# Patient Record
Sex: FEMALE | NOT HISPANIC OR LATINO | URBAN - METROPOLITAN AREA
[De-identification: names, ages, dates, MRNs, and addresses within clinical notes are randomized per-mention and may not be internally consistent; named-entity substitution may affect disease eponyms.]

---

## 2017-01-03 ENCOUNTER — IMPORTED ENCOUNTER (OUTPATIENT)
Dept: URBAN - METROPOLITAN AREA CLINIC 1 | Facility: CLINIC | Age: 32
End: 2017-01-03

## 2017-01-03 PROBLEM — H11.003: Noted: 2017-01-03

## 2017-01-03 PROBLEM — H04.123: Noted: 2017-01-03

## 2017-01-03 PROCEDURE — 92004 COMPRE OPH EXAM NEW PT 1/>: CPT

## 2017-01-03 NOTE — PATIENT DISCUSSION
1.  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 Use of sunglasses and observation were recommended. **Per [facility] quoted her $600.00 per eye if she decides to go with Sx2. Dry Eyes OU -- Recommended to patient to use Artificial Tears BID OU. Gave patient a sample of Systane BID-TID OU 3. Patient defers the refraction at today's visit just done 2 mos ago 4. Return for an appointment in 1 year for 30. with Dr. Joni Schaumann.

## 2018-01-03 ENCOUNTER — IMPORTED ENCOUNTER (OUTPATIENT)
Dept: URBAN - METROPOLITAN AREA CLINIC 1 | Facility: CLINIC | Age: 33
End: 2018-01-03

## 2018-01-03 PROBLEM — H11.003: Noted: 2018-01-03

## 2018-01-03 PROBLEM — H04.123: Noted: 2018-01-03

## 2018-01-03 PROBLEM — H16.143: Noted: 2018-01-03

## 2018-01-03 PROCEDURE — 92014 COMPRE OPH EXAM EST PT 1/>: CPT

## 2018-01-03 PROCEDURE — 92015 DETERMINE REFRACTIVE STATE: CPT

## 2018-01-03 PROCEDURE — 83861 MICROFLUID ANALY TEARS: CPT

## 2018-01-03 NOTE — PATIENT DISCUSSION
1.  GILBERTO w/ PEK OU- Mild Tear Lab results OU today 301/300. Recommend ATs TID-QID OU routinely (sample of Systane Balance given). Consider Restasis if no improvement or worsening of symptoms 2. Pterygium OU -- Use Sunglasses when exposed to UV light. MRX for glasses givenReturn for an appointment in 1 month 10 (check ks) with Dr. Oscar Lainez.

## 2018-06-29 ENCOUNTER — IMPORTED ENCOUNTER (OUTPATIENT)
Dept: URBAN - METROPOLITAN AREA CLINIC 1 | Facility: CLINIC | Age: 33
End: 2018-06-29

## 2018-06-29 PROBLEM — H04.123: Noted: 2018-06-29

## 2018-06-29 PROBLEM — H11.003: Noted: 2018-06-29

## 2018-06-29 PROBLEM — H16.143: Noted: 2018-06-29

## 2018-06-29 PROCEDURE — 92012 INTRM OPH EXAM EST PATIENT: CPT

## 2018-06-29 NOTE — PATIENT DISCUSSION
1.  Pterygium OU -- Patient desires excision for cosmetic reasons. Use Sunglasses when exposed to UV light. Patient requests estimate from [facility] regarding out of pocket cost for Pterygium Excision with Autograft. 2.  GILBERTO w/ PEK OU- Use ATs TID OU Return for an appointment in 1 yr 27 with Dr. Vito Sanchez.

## 2018-07-18 ENCOUNTER — IMPORTED ENCOUNTER (OUTPATIENT)
Dept: URBAN - METROPOLITAN AREA CLINIC 1 | Facility: CLINIC | Age: 33
End: 2018-07-18

## 2018-07-18 PROCEDURE — 65420 REMOVAL OF EYE LESION: CPT

## 2018-07-18 NOTE — PATIENT DISCUSSION
Operative Note: Excision of Pterygium OU Pre-op Dx OD: Pterygium OU Post-op Dx OD : Pterygium OU Procedure: Excision of Pterygium OU Surgeon: Tye Tristan MD. Date: 7/18/18Procedure:Pt was taken to the surgical suite and prepped/ draped in usual sterile manner. OU was anesthetized with 1% Xylocaine with Epi at the site of the Pterygium nasally. Akua Scissors were used to excise Pterygium at it's base and a crescent blade was used to dissect it from the corneal surface. Cautery was used for hemostasis. Pt tolerated procedure well and there was no complications. Pt was placed on Ofloxacin and Pred TID OU. F/u tomorrow.

## 2018-07-19 ENCOUNTER — IMPORTED ENCOUNTER (OUTPATIENT)
Dept: URBAN - METROPOLITAN AREA CLINIC 1 | Facility: CLINIC | Age: 33
End: 2018-07-19

## 2018-07-19 PROBLEM — Z09: Noted: 2018-07-19

## 2018-07-19 PROCEDURE — 99024 POSTOP FOLLOW-UP VISIT: CPT

## 2018-07-19 NOTE — PATIENT DISCUSSION
1. POD#1 Pterygium Excision *OU*- doing well. Use Prednisolone to QID OU Reduce Ocuflox to TID OU till seen. Use ATs TID OU Routinely. No eye rubbing. Return for an appointment in 1-2 weeks PO Pterygium OU with Dr. Zoraida Walton.

## 2018-07-31 ENCOUNTER — IMPORTED ENCOUNTER (OUTPATIENT)
Dept: URBAN - METROPOLITAN AREA CLINIC 1 | Facility: CLINIC | Age: 33
End: 2018-07-31

## 2018-07-31 PROBLEM — H11.001: Noted: 2018-07-31

## 2018-07-31 PROBLEM — H11.012: Noted: 2018-07-31

## 2018-07-31 PROCEDURE — 99024 POSTOP FOLLOW-UP VISIT: CPT

## 2018-07-31 NOTE — PATIENT DISCUSSION
1. POM#1 Pterygium Excision *OU*- Abrasion resolved. Doing well. Decrease Prednisolone to BID OU Decrease Ocuflox to BID OU till seen. Continue ATs TID OU Routinely. No eye rubbing. Return for an appointment for PO in 3 weeks with Dr. Pablo Hernandez.

## 2018-08-21 ENCOUNTER — IMPORTED ENCOUNTER (OUTPATIENT)
Dept: URBAN - METROPOLITAN AREA CLINIC 1 | Facility: CLINIC | Age: 33
End: 2018-08-21

## 2018-08-21 PROCEDURE — 99024 POSTOP FOLLOW-UP VISIT: CPT

## 2018-08-21 NOTE — PATIENT DISCUSSION
1. POM#1 Pterygium Excision *OU*-  Doing well. Decrease Prednisolone to Qdaily OU till out then d/c D/c Ocuflox. Continue ATs TID OU Routinely. No eye rubbing. Return for an appointment in 1 yr 27 with Dr. Brenda Belcher.

## 2019-08-27 ENCOUNTER — IMPORTED ENCOUNTER (OUTPATIENT)
Dept: URBAN - METROPOLITAN AREA CLINIC 1 | Facility: CLINIC | Age: 34
End: 2019-08-27

## 2019-08-27 PROBLEM — H04.123: Noted: 2019-08-27

## 2019-08-27 PROBLEM — H16.143: Noted: 2019-08-27

## 2019-08-27 PROBLEM — H11.003: Noted: 2019-08-27

## 2019-08-27 PROCEDURE — 92014 COMPRE OPH EXAM EST PT 1/>: CPT

## 2019-08-27 PROCEDURE — 92015 DETERMINE REFRACTIVE STATE: CPT

## 2019-08-27 NOTE — PATIENT DISCUSSION
1.  Pterygium OU -- Recurrent: Use Sunglasses when exposed to UV light. Consider repeat excision PRN. 2.  GILBERTO w/ PEK OU- Pt symptomatic despite tear use: Inserted 0.4 Silicone Plug RLL and LLL:  Risks benefits and alternatives to placing plug was discussed with patient. Patient understands and would like to proceed. Consent was signed. Post op instructions were discussed/given to patient and patient was advised to call our office if any problems arose. Use ATs QID OU Routinely. Consider Restasis w/o improvement. 3.  H/o Excision of Pterygium OU Return for an appointment in 6 mo 10 (Check K) with Dr. Yue Greenberg.

## 2019-08-27 NOTE — PATIENT DISCUSSION
GILBERTO w/ PEK OU- Pt symptomatic despite tear use: Inserted Silicone Plug RLL and LLL:  Risks benefits and alternatives to placing plug was discussed with patient. Patient understands and would like to proceed. Consent was signed. Post op instructions were discussed/given to patient and patient was advised to call our office if any problems arose. Use ATs QID OU Routinely.

## 2022-04-02 ASSESSMENT — TONOMETRY
OS_IOP_MMHG: 18
OD_IOP_MMHG: 18
OD_IOP_MMHG: 17
OS_IOP_MMHG: 18
OD_IOP_MMHG: 18
OS_IOP_MMHG: 17
OD_IOP_MMHG: 19
OS_IOP_MMHG: 18
OS_IOP_MMHG: 19
OS_IOP_MMHG: 18

## 2022-04-02 ASSESSMENT — VISUAL ACUITY
OD_CC: 20/20-1
OD_CC: J1+
OD_SC: 20/25-1
OD_CC: 20/25-1
OD_CC: 20/25
OD_CC: 20/20-1
OD_SC: 20/20-2
OS_CC: 20/20-1
OS_SC: 20/20-1
OS_CC: 20/25-2
OS_CC: 20/30
OS_SC: 20/20
OS_SC: 20/25
OS_CC: J1+
OS_CC: 20/20-1
OD_SC: 20/20